# Patient Record
Sex: FEMALE | Race: WHITE | NOT HISPANIC OR LATINO | Employment: FULL TIME | URBAN - METROPOLITAN AREA
[De-identification: names, ages, dates, MRNs, and addresses within clinical notes are randomized per-mention and may not be internally consistent; named-entity substitution may affect disease eponyms.]

---

## 2023-06-23 ENCOUNTER — APPOINTMENT (OUTPATIENT)
Dept: PRIMARY CARE | Facility: CLINIC | Age: 58
End: 2023-06-23
Payer: COMMERCIAL

## 2024-05-08 ENCOUNTER — HOSPITAL ENCOUNTER (EMERGENCY)
Facility: HOSPITAL | Age: 59
Discharge: HOME | End: 2024-05-08
Attending: HOSPITALIST
Payer: COMMERCIAL

## 2024-05-08 VITALS
SYSTOLIC BLOOD PRESSURE: 134 MMHG | TEMPERATURE: 97.9 F | WEIGHT: 190.7 LBS | DIASTOLIC BLOOD PRESSURE: 93 MMHG | OXYGEN SATURATION: 97 % | HEART RATE: 83 BPM | BODY MASS INDEX: 30.65 KG/M2 | HEIGHT: 66 IN | RESPIRATION RATE: 16 BRPM

## 2024-05-08 DIAGNOSIS — J04.0 LARYNGITIS, ACUTE: Primary | ICD-10-CM

## 2024-05-08 DIAGNOSIS — J06.9 UPPER RESPIRATORY TRACT INFECTION, UNSPECIFIED TYPE: ICD-10-CM

## 2024-05-08 LAB
FLUAV RNA RESP QL NAA+PROBE: NOT DETECTED
FLUBV RNA RESP QL NAA+PROBE: NOT DETECTED
S PYO DNA THROAT QL NAA+PROBE: NOT DETECTED
SARS-COV-2 RNA RESP QL NAA+PROBE: NOT DETECTED

## 2024-05-08 PROCEDURE — 2500000001 HC RX 250 WO HCPCS SELF ADMINISTERED DRUGS (ALT 637 FOR MEDICARE OP): Performed by: HOSPITALIST

## 2024-05-08 PROCEDURE — 87651 STREP A DNA AMP PROBE: CPT | Performed by: HOSPITALIST

## 2024-05-08 PROCEDURE — 87636 SARSCOV2 & INF A&B AMP PRB: CPT | Performed by: HOSPITALIST

## 2024-05-08 PROCEDURE — 2500000006 HC RX 250 W HCPCS SELF ADMINISTERED DRUGS (ALT 637 FOR ALL PAYERS): Performed by: HOSPITALIST

## 2024-05-08 PROCEDURE — 99283 EMERGENCY DEPT VISIT LOW MDM: CPT

## 2024-05-08 RX ORDER — ETODOLAC 400 MG/1
1 TABLET, FILM COATED ORAL 2 TIMES DAILY
COMMUNITY
Start: 2016-11-21

## 2024-05-08 RX ORDER — VENLAFAXINE HYDROCHLORIDE 75 MG/1
75 CAPSULE, EXTENDED RELEASE ORAL
COMMUNITY
Start: 2022-11-04

## 2024-05-08 RX ORDER — LEVOTHYROXINE SODIUM 100 UG/1
100 TABLET ORAL
COMMUNITY
Start: 2024-02-01

## 2024-05-08 RX ORDER — AZITHROMYCIN 250 MG/1
250 TABLET, FILM COATED ORAL DAILY
Qty: 4 TABLET | Refills: 0 | Status: SHIPPED | OUTPATIENT
Start: 2024-05-09 | End: 2024-05-13

## 2024-05-08 RX ORDER — GUAIFENESIN 100 MG/5ML
200 SOLUTION ORAL ONCE
Status: COMPLETED | OUTPATIENT
Start: 2024-05-08 | End: 2024-05-08

## 2024-05-08 RX ORDER — AZITHROMYCIN 250 MG/1
500 TABLET, FILM COATED ORAL ONCE
Status: COMPLETED | OUTPATIENT
Start: 2024-05-08 | End: 2024-05-08

## 2024-05-08 RX ADMIN — GUAIFENESIN 200 MG: 200 SOLUTION ORAL at 19:28

## 2024-05-08 RX ADMIN — AZITHROMYCIN DIHYDRATE 500 MG: 250 TABLET ORAL at 20:32

## 2024-05-08 RX ADMIN — BENZOCAINE AND MENTHOL 1 LOZENGE: 15; 3.6 LOZENGE ORAL at 19:28

## 2024-05-08 ASSESSMENT — PAIN - FUNCTIONAL ASSESSMENT: PAIN_FUNCTIONAL_ASSESSMENT: 0-10

## 2024-05-08 ASSESSMENT — PAIN DESCRIPTION - LOCATION: LOCATION: THROAT

## 2024-05-08 ASSESSMENT — PAIN DESCRIPTION - PAIN TYPE: TYPE: ACUTE PAIN

## 2024-05-08 ASSESSMENT — PAIN SCALES - GENERAL: PAINLEVEL_OUTOF10: 6

## 2024-05-09 NOTE — DISCHARGE INSTRUCTIONS
Use over-the-counter cough medicine such as generic guaifenesin or Robitussin syrup.  Salt water gargling frequently throughout the day for relief  Eat soft foods, increase your fluid intake  Tylenol and/or Advil as needed for fever headache

## 2024-05-09 NOTE — ED PROVIDER NOTES
HPI   Chief Complaint   Patient presents with    Cough    Sore Throat     Patient states that she has been coughing for 3 days and has a sore throat. Patient states that today she started coughing up yellow phlegm and was worried about covid or strep throat.   She denies any sick contacts, denies any fevers or chills.      Patient History   Past Medical History:   Diagnosis Date    Hodgkin lymphoma, unspecified, spleen (Multi)     Hodgkin lymphoma, unspecified, spleen    Unspecified cataract     Cataracts, both eyes     Past Surgical History:   Procedure Laterality Date    OTHER SURGICAL HISTORY  03/16/2021    Tonsillectomy     No family history on file.  Social History     Tobacco Use    Smoking status: Never    Smokeless tobacco: Never   Substance Use Topics    Alcohol use: Not on file    Drug use: Not on file       Physical Exam   ED Triage Vitals [05/08/24 1920]   Temperature Heart Rate Respirations BP   36.6 °C (97.9 °F) 96 16 (!) 134/93      Pulse Ox Temp Source Heart Rate Source Patient Position   96 % Oral Monitor --      BP Location FiO2 (%)     -- --       Physical Exam  Gen.: Alert and oriented ×3, hoarse voice.  Head: Normocephalic atraumatic  Eyes:  Pupils equal reactive to light, extraocular muscle intact  Neck: No cervical lymphadenopathy thyromegaly, trachea midline   Heart: Regular rate and rhythm, no murmurs rubs or gallops  Lungs: Clear to auscultation bilaterally, no wheezes, rales, or rhonchi  Abdomen: Soft nontender positive bowel sounds, no rebound or guarding  Extremities: No peripheral edema cyanosis or clubbing  Skin: Warm and intact  Neuro: Cranial nerves II 2 through 12 grossly intact, no focal deficits  Psych: Insight and judgment intact    Labs Reviewed   GROUP A STREPTOCOCCUS, PCR - Normal       Result Value    Group A Strep PCR Not Detected     SARS-COV-2 PCR - Normal    Coronavirus 2019, PCR Not Detected      Narrative:     This assay has received FDA Emergency Use Authorization  (EUA) and is only authorized for the duration of time that circumstances exist to justify the authorization of the emergency use of in vitro diagnostic tests for the detection of SARS-CoV-2 virus and/or diagnosis of COVID-19 infection under section 564(b)(1) of the Act, 21 U.S.C. 360bbb-3(b)(1). This assay is an in vitro diagnostic nucleic acid amplification test for the qualitative detection of SARS-CoV-2 from nasopharyngeal specimens and has been validated for use at [facility]. Negative results do not preclude COVID-19 infections and should not be used as the sole basis for diagnosis, treatment, or other management decisions.     INFLUENZA A AND B PCR - Normal    Flu A Result Not Detected      Flu B Result Not Detected      Narrative:     This assay is an in vitro diagnostic multiplex nucleic acid amplification test for the detection and discrimination of Influenza A & B from nasopharyngeal specimens, and has been validated for use at [facility]. Negative results do not preclude Influenza A/B infections, and should not be used as the sole basis for diagnosis, treatment, or other management decisions. If Influenza A/B and RSV PCR results are negative, testing for Parainfluenza virus, Adenovirus and Metapneumovirus is routinely performed for [facility] pediatric oncology and intensive care inpatients, and is available on other patients by placing an add-on request.        ED Course & MDM   Diagnoses as of 05/08/24 2306   Laryngitis, acute   Upper respiratory tract infection, unspecified type       Medical Decision Making  Patient was tested for COVID-19, influenza A/B which were negative.  She was given Cepacol lozenge, guaifenesin, and Zithromax orally.  She will continue Zithromax at home.  She was advised to take over-the-counter cough syrup and do salt water gargling for relief.    Procedure  Procedures     Elpidio Parekh DO  05/08/24 2035       Elpidio Parekh,  DO  05/08/24 5977

## 2024-11-13 ENCOUNTER — TELEPHONE (OUTPATIENT)
Dept: GASTROENTEROLOGY | Facility: HOSPITAL | Age: 59
End: 2024-11-13
Payer: COMMERCIAL

## 2024-11-13 DIAGNOSIS — Z12.11 COLON CANCER SCREENING: Primary | ICD-10-CM

## 2024-11-13 NOTE — TELEPHONE ENCOUNTER
Patient called as she had a colonoscopy done with you in 2018. She is not a current patient. Per your recommendations, she should have a follow up colon 5 years from the last. Should she need another referral from her primary care or can you order?

## 2024-12-30 ENCOUNTER — APPOINTMENT (OUTPATIENT)
Dept: OBSTETRICS AND GYNECOLOGY | Facility: CLINIC | Age: 59
End: 2024-12-30
Payer: COMMERCIAL

## 2024-12-30 VITALS
WEIGHT: 182 LBS | DIASTOLIC BLOOD PRESSURE: 80 MMHG | HEIGHT: 66 IN | SYSTOLIC BLOOD PRESSURE: 122 MMHG | BODY MASS INDEX: 29.25 KG/M2

## 2024-12-30 DIAGNOSIS — Z12.4 CERVICAL CANCER SCREENING: ICD-10-CM

## 2024-12-30 DIAGNOSIS — Z01.419 WELL WOMAN EXAM: Primary | ICD-10-CM

## 2024-12-30 DIAGNOSIS — Z12.31 BREAST CANCER SCREENING BY MAMMOGRAM: ICD-10-CM

## 2024-12-30 PROCEDURE — 88175 CYTOPATH C/V AUTO FLUID REDO: CPT

## 2024-12-30 PROCEDURE — 87624 HPV HI-RISK TYP POOLED RSLT: CPT

## 2024-12-30 PROCEDURE — 1036F TOBACCO NON-USER: CPT | Performed by: OBSTETRICS & GYNECOLOGY

## 2024-12-30 PROCEDURE — 3008F BODY MASS INDEX DOCD: CPT | Performed by: OBSTETRICS & GYNECOLOGY

## 2024-12-30 PROCEDURE — 99386 PREV VISIT NEW AGE 40-64: CPT | Performed by: OBSTETRICS & GYNECOLOGY

## 2024-12-30 RX ORDER — MAGNESIUM 200 MG
TABLET ORAL
COMMUNITY

## 2024-12-30 RX ORDER — ASCORBIC ACID 250 MG
1 TABLET ORAL DAILY
COMMUNITY

## 2024-12-30 RX ORDER — AZITHROMYCIN 250 MG/1
TABLET, FILM COATED ORAL
COMMUNITY
Start: 2024-12-03

## 2024-12-30 ASSESSMENT — ENCOUNTER SYMPTOMS
SHORTNESS OF BREATH: 0
CHILLS: 0
APPETITE CHANGE: 0
CONSTIPATION: 0
ABDOMINAL PAIN: 0
DYSURIA: 0
COLOR CHANGE: 0
DIARRHEA: 0
FREQUENCY: 0
BLOOD IN STOOL: 0
FEVER: 0
ABDOMINAL DISTENTION: 0
FLANK PAIN: 0
VOMITING: 0
NAUSEA: 0
BACK PAIN: 0
FATIGUE: 0
UNEXPECTED WEIGHT CHANGE: 0
SLEEP DISTURBANCE: 0
HEMATURIA: 0

## 2024-12-30 ASSESSMENT — PAIN SCALES - GENERAL: PAINLEVEL_OUTOF10: 0-NO PAIN

## 2024-12-30 NOTE — PROGRESS NOTES
"History Of Present Illness  Routine Gyn Exam  Christelle Wells here for routine WWE.  Pt is postmenopausal.  Denies spotting or bleeding.     Concerns: none.     Medical and surgical histories reviewed with patient.   Exercise: none        Gynecologic History  Postmenopausal.  Sexually active: not currently ; sexually active with men and women in the past .  Last Pap: unsure last pap .   Last mammogram:  .       Obstetric History  OB History    Para Term  AB Living   0 0 0 0 0 0   SAB IAB Ectopic Multiple Live Births   0 0 0 0 0        Review of Systems   Constitutional:  Negative for appetite change, chills, fatigue, fever and unexpected weight change.   Respiratory:  Negative for shortness of breath.    Cardiovascular:  Negative for chest pain.   Gastrointestinal:  Negative for abdominal distention, abdominal pain, blood in stool, constipation, diarrhea, nausea and vomiting.   Endocrine: Negative for cold intolerance and heat intolerance.   Genitourinary:  Negative for dyspareunia, dysuria, flank pain, frequency, genital sores, hematuria, menstrual problem, pelvic pain, urgency, vaginal bleeding, vaginal discharge and vaginal pain.   Musculoskeletal:  Negative for back pain.   Skin:  Negative for color change.   Psychiatric/Behavioral:  Negative for sleep disturbance.        /80 (BP Location: Right arm, Patient Position: Sitting)   Ht 1.676 m (5' 6\")   Wt 82.6 kg (182 lb)   LMP  (LMP Unknown)   BMI 29.38 kg/m²      Physical Exam  Constitutional:       Appearance: Normal appearance.   HENT:      Head: Normocephalic and atraumatic.   Chest:   Breasts:     Right: Normal.      Left: Normal.   Abdominal:      General: Abdomen is flat.      Palpations: Abdomen is soft.      Tenderness: There is no abdominal tenderness.   Genitourinary:     General: Normal vulva.      Vagina: Erythema (atrophy) present.      Cervix: Normal.      Uterus: Normal.       Adnexa: Right adnexa normal " and left adnexa normal.      Comments: Pap collected today    Skin:     General: Skin is warm and dry.   Neurological:      Mental Status: She is alert and oriented to person, place, and time.   Psychiatric:         Mood and Affect: Mood normal.              Assessment/Plan         Routine Well Woman Exam Today  Discussed diet and exercise.   Reviewed routine health screenings.   Pap: pap done today   Recommend annual mammograms. Mammogram due , order placed.                  Amanda Ford MD

## 2025-01-09 LAB
CYTOLOGY CMNT CVX/VAG CYTO-IMP: NORMAL
HPV HR 12 DNA GENITAL QL NAA+PROBE: NEGATIVE
HPV HR GENOTYPES PNL CVX NAA+PROBE: NEGATIVE
HPV16 DNA SPEC QL NAA+PROBE: NEGATIVE
HPV18 DNA SPEC QL NAA+PROBE: NEGATIVE
LAB AP HPV GENOTYPE QUESTION: YES
LAB AP HPV HR: NORMAL
LABORATORY COMMENT REPORT: NORMAL
PATH REPORT.TOTAL CANCER: NORMAL

## 2025-01-16 ENCOUNTER — APPOINTMENT (OUTPATIENT)
Dept: CARDIOLOGY | Facility: HOSPITAL | Age: 60
End: 2025-01-16
Payer: COMMERCIAL

## 2025-01-16 ENCOUNTER — HOSPITAL ENCOUNTER (EMERGENCY)
Facility: HOSPITAL | Age: 60
Discharge: HOME | End: 2025-01-16
Attending: INTERNAL MEDICINE
Payer: COMMERCIAL

## 2025-01-16 ENCOUNTER — APPOINTMENT (OUTPATIENT)
Dept: RADIOLOGY | Facility: HOSPITAL | Age: 60
End: 2025-01-16
Payer: COMMERCIAL

## 2025-01-16 VITALS
SYSTOLIC BLOOD PRESSURE: 131 MMHG | BODY MASS INDEX: 28.93 KG/M2 | HEART RATE: 78 BPM | TEMPERATURE: 97.4 F | DIASTOLIC BLOOD PRESSURE: 81 MMHG | OXYGEN SATURATION: 94 % | WEIGHT: 180 LBS | RESPIRATION RATE: 17 BRPM | HEIGHT: 66 IN

## 2025-01-16 DIAGNOSIS — A08.4 VIRAL GASTROENTERITIS: Primary | ICD-10-CM

## 2025-01-16 DIAGNOSIS — R55 SYNCOPE, UNSPECIFIED SYNCOPE TYPE: ICD-10-CM

## 2025-01-16 LAB
ALBUMIN SERPL BCP-MCNC: 4 G/DL (ref 3.4–5)
ALP SERPL-CCNC: 72 U/L (ref 33–136)
ALT SERPL W P-5'-P-CCNC: 49 U/L (ref 7–45)
ANION GAP SERPL CALC-SCNC: 12 MMOL/L (ref 10–20)
APPEARANCE UR: CLEAR
AST SERPL W P-5'-P-CCNC: 39 U/L (ref 9–39)
ATRIAL RATE: 56 BPM
BACTERIA #/AREA URNS AUTO: ABNORMAL /HPF
BASOPHILS # BLD AUTO: 0.02 X10*3/UL (ref 0–0.1)
BASOPHILS NFR BLD AUTO: 0.4 %
BILIRUB SERPL-MCNC: 1.8 MG/DL (ref 0–1.2)
BILIRUB UR STRIP.AUTO-MCNC: NEGATIVE MG/DL
BNP SERPL-MCNC: 6 PG/ML (ref 0–99)
BUN SERPL-MCNC: 12 MG/DL (ref 6–23)
CALCIUM SERPL-MCNC: 8.8 MG/DL (ref 8.6–10.3)
CARDIAC TROPONIN I PNL SERPL HS: <3 NG/L (ref 0–13)
CARDIAC TROPONIN I PNL SERPL HS: <3 NG/L (ref 0–13)
CHLORIDE SERPL-SCNC: 106 MMOL/L (ref 98–107)
CO2 SERPL-SCNC: 24 MMOL/L (ref 21–32)
COLOR UR: ABNORMAL
CREAT SERPL-MCNC: 0.9 MG/DL (ref 0.5–1.05)
EGFRCR SERPLBLD CKD-EPI 2021: 73 ML/MIN/1.73M*2
EOSINOPHIL # BLD AUTO: 0.1 X10*3/UL (ref 0–0.7)
EOSINOPHIL NFR BLD AUTO: 2.1 %
ERYTHROCYTE [DISTWIDTH] IN BLOOD BY AUTOMATED COUNT: 12.8 % (ref 11.5–14.5)
FLUAV RNA RESP QL NAA+PROBE: NOT DETECTED
FLUBV RNA RESP QL NAA+PROBE: NOT DETECTED
GLUCOSE SERPL-MCNC: 106 MG/DL (ref 74–99)
GLUCOSE UR STRIP.AUTO-MCNC: NORMAL MG/DL
HCT VFR BLD AUTO: 42.2 % (ref 36–46)
HGB BLD-MCNC: 13.7 G/DL (ref 12–16)
IMM GRANULOCYTES # BLD AUTO: 0.02 X10*3/UL (ref 0–0.7)
IMM GRANULOCYTES NFR BLD AUTO: 0.4 % (ref 0–0.9)
KETONES UR STRIP.AUTO-MCNC: NEGATIVE MG/DL
LEUKOCYTE ESTERASE UR QL STRIP.AUTO: ABNORMAL
LIPASE SERPL-CCNC: 24 U/L (ref 9–82)
LYMPHOCYTES # BLD AUTO: 1.23 X10*3/UL (ref 1.2–4.8)
LYMPHOCYTES NFR BLD AUTO: 26.1 %
MAGNESIUM SERPL-MCNC: 2.1 MG/DL (ref 1.6–2.4)
MCH RBC QN AUTO: 27 PG (ref 26–34)
MCHC RBC AUTO-ENTMCNC: 32.5 G/DL (ref 32–36)
MCV RBC AUTO: 83 FL (ref 80–100)
MONOCYTES # BLD AUTO: 0.38 X10*3/UL (ref 0.1–1)
MONOCYTES NFR BLD AUTO: 8.1 %
MUCOUS THREADS #/AREA URNS AUTO: ABNORMAL /LPF
NEUTROPHILS # BLD AUTO: 2.97 X10*3/UL (ref 1.2–7.7)
NEUTROPHILS NFR BLD AUTO: 62.9 %
NITRITE UR QL STRIP.AUTO: NEGATIVE
NRBC BLD-RTO: 0 /100 WBCS (ref 0–0)
P AXIS: -16 DEGREES
P OFFSET: 187 MS
P ONSET: 134 MS
PH UR STRIP.AUTO: 5 [PH]
PLATELET # BLD AUTO: 107 X10*3/UL (ref 150–450)
POTASSIUM SERPL-SCNC: 3.9 MMOL/L (ref 3.5–5.3)
PR INTERVAL: 162 MS
PROT SERPL-MCNC: 6.7 G/DL (ref 6.4–8.2)
PROT UR STRIP.AUTO-MCNC: NEGATIVE MG/DL
Q ONSET: 215 MS
QRS COUNT: 9 BEATS
QRS DURATION: 98 MS
QT INTERVAL: 456 MS
QTC CALCULATION(BAZETT): 440 MS
QTC FREDERICIA: 446 MS
R AXIS: -11 DEGREES
RBC # BLD AUTO: 5.08 X10*6/UL (ref 4–5.2)
RBC # UR STRIP.AUTO: NEGATIVE /UL
RBC #/AREA URNS AUTO: ABNORMAL /HPF
SARS-COV-2 RNA RESP QL NAA+PROBE: NOT DETECTED
SODIUM SERPL-SCNC: 138 MMOL/L (ref 136–145)
SP GR UR STRIP.AUTO: 1.01
SQUAMOUS #/AREA URNS AUTO: ABNORMAL /HPF
T AXIS: -14 DEGREES
T OFFSET: 443 MS
TSH SERPL-ACNC: 1.14 MIU/L (ref 0.44–3.98)
UROBILINOGEN UR STRIP.AUTO-MCNC: NORMAL MG/DL
VENTRICULAR RATE: 56 BPM
WBC # BLD AUTO: 4.7 X10*3/UL (ref 4.4–11.3)
WBC #/AREA URNS AUTO: ABNORMAL /HPF

## 2025-01-16 PROCEDURE — 84484 ASSAY OF TROPONIN QUANT: CPT | Performed by: INTERNAL MEDICINE

## 2025-01-16 PROCEDURE — 87636 SARSCOV2 & INF A&B AMP PRB: CPT | Performed by: INTERNAL MEDICINE

## 2025-01-16 PROCEDURE — 84075 ASSAY ALKALINE PHOSPHATASE: CPT | Performed by: INTERNAL MEDICINE

## 2025-01-16 PROCEDURE — 71046 X-RAY EXAM CHEST 2 VIEWS: CPT | Mod: FOREIGN READ | Performed by: RADIOLOGY

## 2025-01-16 PROCEDURE — 36415 COLL VENOUS BLD VENIPUNCTURE: CPT | Performed by: INTERNAL MEDICINE

## 2025-01-16 PROCEDURE — 83690 ASSAY OF LIPASE: CPT | Performed by: INTERNAL MEDICINE

## 2025-01-16 PROCEDURE — 87086 URINE CULTURE/COLONY COUNT: CPT | Mod: AHULAB | Performed by: INTERNAL MEDICINE

## 2025-01-16 PROCEDURE — 71046 X-RAY EXAM CHEST 2 VIEWS: CPT

## 2025-01-16 PROCEDURE — 85025 COMPLETE CBC W/AUTO DIFF WBC: CPT | Performed by: INTERNAL MEDICINE

## 2025-01-16 PROCEDURE — 93005 ELECTROCARDIOGRAM TRACING: CPT

## 2025-01-16 PROCEDURE — 2500000004 HC RX 250 GENERAL PHARMACY W/ HCPCS (ALT 636 FOR OP/ED): Performed by: INTERNAL MEDICINE

## 2025-01-16 PROCEDURE — 96361 HYDRATE IV INFUSION ADD-ON: CPT

## 2025-01-16 PROCEDURE — 96374 THER/PROPH/DIAG INJ IV PUSH: CPT

## 2025-01-16 PROCEDURE — 83880 ASSAY OF NATRIURETIC PEPTIDE: CPT | Performed by: INTERNAL MEDICINE

## 2025-01-16 PROCEDURE — 99285 EMERGENCY DEPT VISIT HI MDM: CPT | Mod: 25 | Performed by: INTERNAL MEDICINE

## 2025-01-16 PROCEDURE — 84443 ASSAY THYROID STIM HORMONE: CPT | Performed by: INTERNAL MEDICINE

## 2025-01-16 PROCEDURE — 83735 ASSAY OF MAGNESIUM: CPT | Performed by: INTERNAL MEDICINE

## 2025-01-16 PROCEDURE — 81001 URINALYSIS AUTO W/SCOPE: CPT | Performed by: INTERNAL MEDICINE

## 2025-01-16 RX ORDER — ONDANSETRON 4 MG/1
4 TABLET, ORALLY DISINTEGRATING ORAL EVERY 8 HOURS PRN
Qty: 15 TABLET | Refills: 0 | Status: SHIPPED | OUTPATIENT
Start: 2025-01-16

## 2025-01-16 RX ORDER — ONDANSETRON HYDROCHLORIDE 2 MG/ML
4 INJECTION, SOLUTION INTRAVENOUS ONCE
Status: COMPLETED | OUTPATIENT
Start: 2025-01-16 | End: 2025-01-16

## 2025-01-16 RX ORDER — LOPERAMIDE HYDROCHLORIDE 2 MG/1
2 CAPSULE ORAL 4 TIMES DAILY PRN
Qty: 9 CAPSULE | Refills: 0 | Status: SHIPPED | OUTPATIENT
Start: 2025-01-16 | End: 2025-01-19

## 2025-01-16 RX ADMIN — ONDANSETRON 4 MG: 2 INJECTION, SOLUTION INTRAMUSCULAR; INTRAVENOUS at 12:13

## 2025-01-16 RX ADMIN — SODIUM CHLORIDE 1000 ML: 9 INJECTION, SOLUTION INTRAVENOUS at 11:39

## 2025-01-16 ASSESSMENT — PAIN SCALES - GENERAL: PAINLEVEL_OUTOF10: 0 - NO PAIN

## 2025-01-16 ASSESSMENT — PAIN - FUNCTIONAL ASSESSMENT: PAIN_FUNCTIONAL_ASSESSMENT: 0-10

## 2025-01-16 ASSESSMENT — COLUMBIA-SUICIDE SEVERITY RATING SCALE - C-SSRS
1. IN THE PAST MONTH, HAVE YOU WISHED YOU WERE DEAD OR WISHED YOU COULD GO TO SLEEP AND NOT WAKE UP?: NO
2. HAVE YOU ACTUALLY HAD ANY THOUGHTS OF KILLING YOURSELF?: NO
6. HAVE YOU EVER DONE ANYTHING, STARTED TO DO ANYTHING, OR PREPARED TO DO ANYTHING TO END YOUR LIFE?: NO

## 2025-01-16 NOTE — ED PROVIDER NOTES
HPI     CC: Syncope     HPI: Christelle Wells is a 59 y.o. female with a history of Hodgkin's disease, AML, hypothyroidism, anxiety, migraines, OA, RA, presents with syncope and concern for dehydration.  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  She landed on her knees and buttocks, did not hit her head.  She states she has been throwing up for the past few days, from Tuesday evening to Wednesday morning, yesterday.  P.o. intake has been reduced but she has been her to hydrate with Gatorade, etc.  She has associated diarrhea.  Although the triage note states that patient tested positive for influenza on Tuesday, patient denies this, states she never had a test.  She denies chest pain, ECG is nonischemic with no signs of arrhythmia.  Cough, congestion, or shortness of breath, fever or chills.  Urine output has been normal.  She does have a mild headache, denies vision changes, numbness/tingling, or weakness.  She has not tried to ambulate since the syncopal event.      ROS: 10-point review of systems was performed and is otherwise negative except as noted in HPI.    Limitations to history: N/A    Independent Historians: Coworker    External Records Reviewed: Outpatient notes in EMR    Past Medical History: Noncontributory except per HPI     Past Surgical History: Noncontributory except per HPI     Family History: Reviewed and noncontributory     Social History:  Denies tobacco. Denies ETOH. Denies illicit drugs.    Social Determinants Affecting Care: N/A    No Known Allergies    Home Meds:   Current Outpatient Medications   Medication Instructions    ascorbic acid (Vitamin C) 250 mg tablet 1 tablet, oral, Daily    azithromycin (Zithromax) 250 mg tablet TAKE 2 TABLETS (500 MG) BY MOUTH ON DAY 1. THEN ONE TABLET (250 MG) ONCE DAILY BY MOUTH ON DAYS 2-5.    cyanocobalamin, vitamin B-12, 1,000 mcg tablet, sublingual sublingual     "levothyroxine (SYNTHROID, LEVOXYL) 100 mcg, Daily RT    loperamide (IMODIUM) 2 mg, oral, 4 times daily PRN    ondansetron ODT (ZOFRAN-ODT) 4 mg, oral, Every 8 hours PRN    venlafaxine XR (EFFEXOR-XR) 75 mg, Daily RT        Physical Exam     ED Triage Vitals [01/16/25 1011]   Temperature Heart Rate Respirations BP   36.3 °C (97.4 °F) 56 17 124/80      Pulse Ox Temp src Heart Rate Source Patient Position   96 % -- -- --      BP Location FiO2 (%)     -- --         Heart Rate:  [56-78]   Temperature:  [36.3 °C (97.4 °F)]   Respirations:  [17]   BP: (115-131)/(71-84)   Height:  [167.6 cm (5' 6\")]   Weight:  [81.6 kg (180 lb)]   Pulse Ox:  [94 %-98 %]      Physical Exam  Vitals and nursing note reviewed.     CONSTITUTIONAL: Well appearing, well nourished, in no acute distress.   HENMT: Head atraumatic. Airway patent. Nasal mucosa clear. Mouth with normal mucosa, clear oropharynx. Uvula midline. Neck supple.    EYES: Clear bilaterally, pupils equally round and reactive to light.   CARDIOVASCULAR: Normal rate, regular rhythm.  Heart sounds S1, S2.  No murmurs, rubs or gallops. Normal pulses. Capillary refill < 2 sec.   RESPIRATORY: No increased work of breathing. Breath sounds clear and equal bilaterally.  GASTROINTESTINAL: Abdomen soft, non-distended, non-tender. No rebound, no guarding. Normal bowel sounds. No palpable masses.  GENITOURINARY:  No CVA tenderness.  MUSCULOSKELETAL: Spine appears normal, range of motion is not limited, no muscle or joint tenderness. No edema.   NEUROLOGICAL: AAO x3. CN II-XII intact. 5/5 strength and SILT UEs/LEs. FTN/HTS intact. NIHSS 0.  SKIN: Warm, dry and intact. No rash or notable lesions.  PSYCHIATRIC: Normal mood and affect.  HEME/LYMPH: No adenopathy or splenomegaly.    Diagnostic Results      ECG: ECGs read and interpreted by me. See ED Course, below, for interpretation.    Labs Reviewed   CBC WITH AUTO DIFFERENTIAL - Abnormal       Result Value    WBC 4.7      nRBC 0.0      RBC " 5.08      Hemoglobin 13.7      Hematocrit 42.2      MCV 83      MCH 27.0      MCHC 32.5      RDW 12.8      Platelets 107 (*)     Neutrophils % 62.9      Immature Granulocytes %, Automated 0.4      Lymphocytes % 26.1      Monocytes % 8.1      Eosinophils % 2.1      Basophils % 0.4      Neutrophils Absolute 2.97      Immature Granulocytes Absolute, Automated 0.02      Lymphocytes Absolute 1.23      Monocytes Absolute 0.38      Eosinophils Absolute 0.10      Basophils Absolute 0.02     COMPREHENSIVE METABOLIC PANEL - Abnormal    Glucose 106 (*)     Sodium 138      Potassium 3.9      Chloride 106      Bicarbonate 24      Anion Gap 12      Urea Nitrogen 12      Creatinine 0.90      eGFR 73      Calcium 8.8      Albumin 4.0      Alkaline Phosphatase 72      Total Protein 6.7      AST 39      Bilirubin, Total 1.8 (*)     ALT 49 (*)    URINALYSIS WITH REFLEX CULTURE AND MICROSCOPIC - Abnormal    Color, Urine Light-Yellow      Appearance, Urine Clear      Specific Gravity, Urine 1.009      pH, Urine 5.0      Protein, Urine NEGATIVE      Glucose, Urine Normal      Blood, Urine NEGATIVE      Ketones, Urine NEGATIVE      Bilirubin, Urine NEGATIVE      Urobilinogen, Urine Normal      Nitrite, Urine NEGATIVE      Leukocyte Esterase, Urine 250 Immanuel/uL (*)    MICROSCOPIC ONLY, URINE - Abnormal    WBC, Urine 1-5      RBC, Urine 1-2      Squamous Epithelial Cells, Urine 1-9 (SPARSE)      Bacteria, Urine 1+ (*)     Mucus, Urine FEW     MAGNESIUM - Normal    Magnesium 2.10     B-TYPE NATRIURETIC PEPTIDE - Normal    BNP 6      Narrative:        <100 pg/mL - Heart failure unlikely  100-299 pg/mL - Intermediate probability of acute heart                  failure exacerbation. Correlate with clinical                  context and patient history.    >=300 pg/mL - Heart Failure likely. Correlate with clinical                  context and patient history.    BNP testing is performed using different testing methodology at [facility] than at other [facility]. Direct result comparisons should only be made within the same method.      SERIAL TROPONIN-INITIAL - Normal    Troponin I, High Sensitivity <3      Narrative:     Less than 99th percentile of normal range cutoff-  Female and children under 18 years old <14 ng/L; Male <21 ng/L: Negative  Repeat testing should be performed if clinically indicated.     Female and children under 18 years old 14-50 ng/L; Male 21-50 ng/L:  Consistent with possible cardiac damage and possible increased clinical   risk. Serial measurements may help to assess extent of myocardial damage.     >50 ng/L: Consistent with cardiac damage, increased clinical risk and  myocardial infarction. Serial measurements may help assess extent of   myocardial damage.      NOTE: Children less than 1 year old may have higher baseline troponin   levels and results should be interpreted in conjunction with the overall   clinical context.     NOTE: Troponin I testing is performed using a different   testing methodology at [facility] than at [facility]. Direct result comparisons should only   be made within the same method.   SERIAL TROPONIN, 1 HOUR - Normal    Troponin I, High Sensitivity <3      Narrative:     Less than 99th percentile of normal range cutoff-  Female and children under 18 years old <14 ng/L; Male <21 ng/L: Negative  Repeat testing should be performed if clinically indicated.     Female and children under 18 years old 14-50 ng/L; Male 21-50 ng/L:  Consistent with possible cardiac damage and possible increased clinical   risk. Serial measurements may help to assess extent of myocardial damage.     >50 ng/L: Consistent with cardiac damage, increased clinical risk and  myocardial infarction. Serial measurements may help assess extent of   myocardial damage.      NOTE: Children less than 1 year old may have higher baseline troponin   levels and results should be interpreted in  conjunction with the overall   clinical context.     NOTE: Troponin I testing is performed using a different   testing methodology at [facility] than at other   [facility]. Direct result comparisons should only   be made within the same method.   SARS-COV-2 AND INFLUENZA A/B PCR - Normal    Flu A Result Not Detected      Flu B Result Not Detected      Coronavirus 2019, PCR Not Detected      Narrative:     This assay has received FDA Emergency Use Authorization (EUA) and  is only authorized for the duration of time that circumstances exist to justify the authorization of the emergency use of in vitro diagnostic tests for the detection of SARS-CoV-2 virus and/or diagnosis of COVID-19 infection under section 564(b)(1) of the Act, 21 U.S.C. 360bbb-3(b)(1). Testing for SARS-CoV-2 is only recommended for patients who meet current clinical and/or epidemiological criteria as defined by federal, state, or local public health directives. This assay is an in vitro diagnostic nucleic acid amplification test for the qualitative detection of SARS-CoV-2, Influenza A, and Influenza B from nasopharyngeal specimens and has been validated for use at [facility]. Negative results do not preclude COVID-19 infections or Influenza A/B infections, and should not be used as the sole basis for diagnosis, treatment, or other management decisions. If Influenza A/B and RSV PCR results are negative, testing for Parainfluenza virus, Adenovirus and Metapneumovirus is routinely performed for [facility] pediatric oncology and intensive care inpatients, and is available on other patients by placing an add-on request.    LIPASE - Normal    Lipase 24      Narrative:     Venipuncture immediately after or during the administration of Metamizole may lead to falsely low results. Testing should be performed immediately prior to Metamizole dosing.   TSH WITH REFLEX TO FREE T4 IF ABNORMAL - Normal    Thyroid Stimulating  Hormone 1.14      Narrative:     TSH testing is performed using different testing methodology at [facility] than at other [facility]. Direct result comparisons should only be made within the same method.     URINE CULTURE   TROPONIN SERIES- (INITIAL, 1 HR)    Narrative:     The following orders were created for panel order Troponin I Series, High Sensitivity (0, 1 HR).  Procedure                               Abnormality         Status                     ---------                               -----------         ------                     Troponin I, High Sensiti...[[id number]]  Normal              Final result               Troponin, High Sensitivi...[[id number]]  Normal              Final result                 Please view results for these tests on the individual orders.   URINALYSIS WITH REFLEX CULTURE AND MICROSCOPIC    Narrative:     The following orders were created for panel order Urinalysis with Reflex Culture and Microscopic.  Procedure                               Abnormality         Status                     ---------                               -----------         ------                     Urinalysis with Reflex C...[[id number]]  Abnormal            Final result               Extra Urine Gray Tube[[id number]]                                                         Please view results for these tests on the individual orders.   EXTRA URINE GRAY TUBE         XR chest 2 views   Final Result   No radiographic evidence of acute cardiopulmonary disease.   Signed by Major Dukes MD                    No data recorded                Procedure  Procedures    ED Course & MDM   Assessment/Plan:   Christelle Wells is a 59 y.o. female with a history of Hodgkin's disease, AML, hypothyroidism, anxiety, migraines, OA, RA, presents with syncope and concern for dehydration.  She has had nausea/vomiting and diarrhea for the past few days.  Suspect probable viral illness with vasovagal syncopal episode.   Workup was initiated with ECG, labs, chest x-ray.  She was given a fluid bolus.  Will check orthostatics. See below for details of ED course and ultimate disposition.    Medications   sodium chloride 0.9 % bolus 1,000 mL (0 mL intravenous Stopped 1/16/25 1319)   ondansetron (Zofran) injection 4 mg (4 mg intravenous Given 1/16/25 1213)        ED Course as of 01/16/25 1523   Thu Jan 16, 2025   1045 ECG read and interpreted by me.  Sinus bradycardia, rate 56.  LVH.  Normal axis.  Normal intervals.  No significant ST or T wave derangements. [CG]   1227 CXR no radiographic evidence of acute cardiopulmonary disease. [CG]   1227 Labs are notable for normal CBC without leukocytosis, mild thrombocytopenia 107, normal CMP except for elevated ALT 49 and total serum bilirubin 1.8, normal troponin, magnesium, BNP.  Negative viral swabs. [CG]   1316 Repeat troponin negative [CG]   1320 Orthostatics negative. Blood pressure actually went up from lying to standing. Heart rate increased by 18 points [CG]   1410 Lipase and TSH WNL [CG]   1423 Patient was reevaluated.  She is feeling much better after fluids.  She is amenable to being discharged home with Zofran and Imodium.  Patient was discharged home with anticipatory guidance and strict return precautions. [CG]   1445 UA with LE and 1-5 WBCs, 1+ bacteria [CG]      ED Course User Index  [CG] Pat Wallace MD         Diagnoses as of 01/16/25 1523   Viral gastroenteritis   Syncope, unspecified syncope type       Disposition:   DISCHARGE.  The patient was discharged with both verbal and written anticipatory guidance and strict return precautions. Discharge diagnosis, instructions and plan were discussed and understood. I emphasized the importance of following up with their primary care provider within 24-48 hours and with any referrals in the timeframe recommended. At the time of discharge the patient was comfortable and was in no apparent distress. Patient is aware of  diagnostic uncertainty and was notified though testing is negative here, there is a very small chance that pathology may be missed.  The patient understands these risks and the patient/family understood to call 911 or return immediately to the emergency department if the symptoms worsen or if they have any additional concerns.      FOLLOW UP  Primary care provider in 1-2 days.      ED Prescriptions       Medication Sig Dispense Start Date End Date Auth. Provider    ondansetron ODT (Zofran-ODT) 4 mg disintegrating tablet Dissolve 1 tablet (4 mg) in the mouth every 8 hours if needed for nausea or vomiting. 15 tablet 1/16/2025 -- Pat Wallace MD    loperamide (Imodium) 2 mg capsule Take 1 capsule (2 mg) by mouth 4 times a day as needed for diarrhea for up to 3 days. 9 capsule 1/16/2025 1/19/2025 MD Pat Calix MD  EM/IM/Peds    This note was dictated by speech recognition. Minor errors in transcription may be present.     Pat Wallace MD  01/16/25 7939

## 2025-01-16 NOTE — DISCHARGE INSTRUCTIONS
You were seen today for viral symptoms and concern for dehydration.  Your evaluation was not concerning for an emergency at this time.  We gave you prescription for an antinausea and antidiarrheal.  Please see the attached information sheet for information about your condition, how to care for your condition at home, and reasons to return to the emergency department. Take any prescriptions written today as prescribed. You should call your primary care provider within 24 hours to tell them about today's visit, including any new medications or medication changes, as he or she may want to see you in the office for further evaluation. If you do not have a primary care provider, call  [phone] for an appointment. We offer in-person office visits as well as virtual options. Please do not hesitate to call  [phone] or return to the emergency department with any new or unresolved concerns or symptoms. Thank you for choosing [facility] for your care.

## 2025-01-16 NOTE — ED TRIAGE NOTES
Pt. To ED for syncope episode while at work. Patient states she felt weak and dizzy prior to lowering her self to the ground. No LOC No blood thinners. Patient denies trauma or injury. Patient tested positive for the flu on Tuesday today her first day back at work.

## 2025-01-17 LAB — BACTERIA UR CULT: NORMAL

## 2025-01-21 ENCOUNTER — HOSPITAL ENCOUNTER (OUTPATIENT)
Dept: RADIOLOGY | Facility: CLINIC | Age: 60
Discharge: HOME | End: 2025-01-21
Payer: COMMERCIAL

## 2025-01-21 DIAGNOSIS — Z12.31 BREAST CANCER SCREENING BY MAMMOGRAM: ICD-10-CM

## 2025-01-21 PROCEDURE — 77063 BREAST TOMOSYNTHESIS BI: CPT | Performed by: RADIOLOGY

## 2025-01-21 PROCEDURE — 77067 SCR MAMMO BI INCL CAD: CPT

## 2025-01-21 PROCEDURE — 77067 SCR MAMMO BI INCL CAD: CPT | Performed by: RADIOLOGY

## 2025-02-09 LAB
ATRIAL RATE: 56 BPM
P AXIS: -16 DEGREES
P OFFSET: 187 MS
P ONSET: 134 MS
PR INTERVAL: 162 MS
Q ONSET: 215 MS
QRS COUNT: 9 BEATS
QRS DURATION: 98 MS
QT INTERVAL: 456 MS
QTC CALCULATION(BAZETT): 440 MS
QTC FREDERICIA: 446 MS
R AXIS: -11 DEGREES
T AXIS: -14 DEGREES
T OFFSET: 443 MS
VENTRICULAR RATE: 56 BPM

## 2025-04-03 RX ORDER — VENLAFAXINE HYDROCHLORIDE 75 MG/1
1 CAPSULE, EXTENDED RELEASE ORAL DAILY
COMMUNITY
Start: 2021-12-07

## 2025-04-03 RX ORDER — LEVOTHYROXINE SODIUM 112 UG/1
112 TABLET ORAL
COMMUNITY

## 2025-04-03 RX ORDER — SUMATRIPTAN SUCCINATE 50 MG/1
1 TABLET ORAL AS NEEDED
COMMUNITY
Start: 2024-12-31

## 2025-04-03 RX ORDER — CHOLECALCIFEROL (VITAMIN D3) 25 MCG
1000 TABLET ORAL
COMMUNITY

## 2025-04-03 RX ORDER — BENZONATATE 200 MG/1
CAPSULE ORAL
COMMUNITY
Start: 2025-03-17

## 2025-04-10 ENCOUNTER — APPOINTMENT (OUTPATIENT)
Dept: GASTROENTEROLOGY | Facility: HOSPITAL | Age: 60
End: 2025-04-10
Payer: COMMERCIAL

## 2025-04-16 ENCOUNTER — APPOINTMENT (OUTPATIENT)
Dept: ORTHOPEDIC SURGERY | Facility: CLINIC | Age: 60
End: 2025-04-16
Payer: COMMERCIAL

## 2025-04-16 DIAGNOSIS — M25.562 LEFT KNEE PAIN, UNSPECIFIED CHRONICITY: ICD-10-CM

## 2025-05-02 ENCOUNTER — APPOINTMENT (OUTPATIENT)
Dept: ORTHOPEDIC SURGERY | Facility: CLINIC | Age: 60
End: 2025-05-02
Payer: COMMERCIAL

## 2025-05-02 ENCOUNTER — SPECIALTY PHARMACY (OUTPATIENT)
Dept: PHARMACY | Facility: CLINIC | Age: 60
End: 2025-05-02

## 2025-05-02 DIAGNOSIS — M79.672 LEFT FOOT PAIN: ICD-10-CM

## 2025-05-02 DIAGNOSIS — M17.12 ARTHRITIS OF LEFT KNEE: ICD-10-CM

## 2025-05-02 PROCEDURE — 1036F TOBACCO NON-USER: CPT | Performed by: ORTHOPAEDIC SURGERY

## 2025-05-02 PROCEDURE — 99204 OFFICE O/P NEW MOD 45 MIN: CPT | Performed by: ORTHOPAEDIC SURGERY

## 2025-05-02 RX ORDER — HYALURONATE SODIUM, STABILIZED 60 MG/3 ML
60 SYRINGE (ML) INTRAARTICULAR ONCE
Qty: 3 ML | Refills: 0 | Status: SHIPPED | OUTPATIENT
Start: 2025-05-02 | End: 2025-05-03

## 2025-05-02 ASSESSMENT — PAIN SCALES - GENERAL: PAINLEVEL_OUTOF10: 8

## 2025-05-02 ASSESSMENT — PAIN - FUNCTIONAL ASSESSMENT: PAIN_FUNCTIONAL_ASSESSMENT: 0-10

## 2025-05-02 NOTE — LETTER
May 2, 2025     Marco A Haile PA-C  [address] [facility] [address]  [place] [address]    Patient: Christelle Wells   YOB: 1965   Date of Visit: 5/2/2025       Dear Dr. Marco A Haile PA-C:    Thank you for referring Christelle Wells to me for evaluation. Below are my notes for this consultation.  If you have questions, please do not hesitate to call me. I look forward to following your patient along with you.       Sincerely,     Agustin Lugo MD      CC: No Recipients  ______________________________________________________________________________________    This is a consultation from Dr. Marco A Haile PA-C for   Chief Complaint   Patient presents with   • Left Knee - Pain       This is a 60 y.o. female who presents for evaluation of left knee pain.  Patient states she has had issues with the left knee on and off for years, is longstanding chronic problems been recently exacerbated.  Complains of sharp pain over the lateral aspect of the knee worse with walking but with rest.  No numbness or tingling no fevers or chills no shooting pain down the leg.  It bothers her quite a bit and interferes with her activities.  She has never had surgery on the knee.  She has had injections in the past which have been helpful but only for a few weeks.  Most recent cortisone injection in March.    Physical Exam    There has been no interval change in this patient's past medical, surgical, medications, allergies, family history or social history since the most recent visit to a provider within our department. 14 point review of systems was performed, reviewed, and negative except for pertinent positives documented in the history of present illness.     Constitutional: well developed, well nourished female in no acute distress  Psychiatric: normal mood, appropriate affect  Eyes: sclera anicteric  HENT: normocephalic/atraumatic  CV: regular rate and rhythm   Respiratory: non labored  breathing  Integumentary: no rash  Neurological: moves all extremities    Left knee exam: skin intact no lacerations or abrations. no effusion.  Tender lateral joint line. negative log roll negative patellar grind. ROM 0-120. stable to varus and valgus stress at 0 and 30 degrees. negative lachman negative posterior drawer negative kelly. 5/5 ehl/fhl/gs/ta. silt s/s/sp/dp/t. 2+ dp/pt        Imaging:  Xrays on outside disc were reviewed and independently interpreted by me today, they show moderate degenerative changes of the left knee with lateral joint space narrowing    Procedures      Impression/Plan: This is a 60 y.o. female with left knee arthritis.  I had an in depth discussion with the patient regarding treatment options for arthritis and their relative risks and benefits. We reviewed surgical and nonsurgical option for treatment. Treatments include anti inflammatory medications, physical therapy, weight loss, activity modification, use of assistive devices, injection therapies. We discussed current prescriptions and risks and benefits of continuation of prescription medication as apporpriate. We discussed that arthritis is often progressive over time, an in end stage arthritis surgical interventions can be considered, including arthroplasty. All questions were answered and the patient voiced their understanding.  At this point her knee is not radiographically ready for total knee however she has significant degenerative changes.  Will get her set up with a gel injection, I discussed the risk benefits and appropriate use of that prescription medication today.  Will see her back once available    BMI Readings from Last 1 Encounters:   01/16/25 29.05 kg/m²      Lab Results   Component Value Date    CREATININE 0.90 01/16/2025     Tobacco Use: Low Risk  (5/2/2025)    Patient History    • Smoking Tobacco Use: Never    • Smokeless Tobacco Use: Never    • Passive Exposure: Not on file      Computed MELD 3.0  "unavailable. One or more values for this score either were not found within the given timeframe or did not fit some other criterion.  Computed MELD-Na unavailable. One or more values for this score either were not found within the given timeframe or did not fit some other criterion.       Lab Results   Component Value Date    HGBA1C 5.8 (H) 12/31/2024     No results found for: \"STAPHMRSASCR\""

## 2025-05-02 NOTE — PROGRESS NOTES
This is a consultation from Dr. Marco A Haile PA-C for   Chief Complaint   Patient presents with    Left Knee - Pain       This is a 60 y.o. female who presents for evaluation of left knee pain.  Patient states she has had issues with the left knee on and off for years, is longstanding chronic problems been recently exacerbated.  Complains of sharp pain over the lateral aspect of the knee worse with walking but with rest.  No numbness or tingling no fevers or chills no shooting pain down the leg.  It bothers her quite a bit and interferes with her activities.  She has never had surgery on the knee.  She has had injections in the past which have been helpful but only for a few weeks.  Most recent cortisone injection in March.    Physical Exam    There has been no interval change in this patient's past medical, surgical, medications, allergies, family history or social history since the most recent visit to a provider within our department. 14 point review of systems was performed, reviewed, and negative except for pertinent positives documented in the history of present illness.     Constitutional: well developed, well nourished female in no acute distress  Psychiatric: normal mood, appropriate affect  Eyes: sclera anicteric  HENT: normocephalic/atraumatic  CV: regular rate and rhythm   Respiratory: non labored breathing  Integumentary: no rash  Neurological: moves all extremities    Left knee exam: skin intact no lacerations or abrations. no effusion.  Tender lateral joint line. negative log roll negative patellar grind. ROM 0-120. stable to varus and valgus stress at 0 and 30 degrees. negative lachman negative posterior drawer negative kelly. 5/5 ehl/fhl/gs/ta. silt s/s/sp/dp/t. 2+ dp/pt        Imaging:  Xrays on outside disc were reviewed and independently interpreted by me today, they show moderate degenerative changes of the left knee with lateral joint space  "narrowing    Procedures      Impression/Plan: This is a 60 y.o. female with left knee arthritis.  I had an in depth discussion with the patient regarding treatment options for arthritis and their relative risks and benefits. We reviewed surgical and nonsurgical option for treatment. Treatments include anti inflammatory medications, physical therapy, weight loss, activity modification, use of assistive devices, injection therapies. We discussed current prescriptions and risks and benefits of continuation of prescription medication as apporpriate. We discussed that arthritis is often progressive over time, an in end stage arthritis surgical interventions can be considered, including arthroplasty. All questions were answered and the patient voiced their understanding.  At this point her knee is not radiographically ready for total knee however she has significant degenerative changes.  Will get her set up with a gel injection, I discussed the risk benefits and appropriate use of that prescription medication today.  Will see her back once available    BMI Readings from Last 1 Encounters:   01/16/25 29.05 kg/m²      Lab Results   Component Value Date    CREATININE 0.90 01/16/2025     Tobacco Use: Low Risk  (5/2/2025)    Patient History     Smoking Tobacco Use: Never     Smokeless Tobacco Use: Never     Passive Exposure: Not on file      Computed MELD 3.0 unavailable. One or more values for this score either were not found within the given timeframe or did not fit some other criterion.  Computed MELD-Na unavailable. One or more values for this score either were not found within the given timeframe or did not fit some other criterion.       Lab Results   Component Value Date    HGBA1C 5.8 (H) 12/31/2024     No results found for: \"STAPHMRSASCR\"  "

## 2025-05-09 ENCOUNTER — HOSPITAL ENCOUNTER (OUTPATIENT)
Dept: RADIOLOGY | Facility: EXTERNAL LOCATION | Age: 60
Discharge: HOME | End: 2025-05-09

## 2025-06-02 ENCOUNTER — TELEPHONE (OUTPATIENT)
Dept: ORTHOPEDIC SURGERY | Facility: CLINIC | Age: 60
End: 2025-06-02
Payer: COMMERCIAL

## 2025-06-02 NOTE — TELEPHONE ENCOUNTER
Sent PA form to [facility] to get approval for Durolane through medical benefits. Denied through rx benefits.

## 2025-07-01 ENCOUNTER — APPOINTMENT (OUTPATIENT)
Dept: ORTHOPEDIC SURGERY | Facility: CLINIC | Age: 60
End: 2025-07-01
Payer: COMMERCIAL

## 2025-07-02 ENCOUNTER — TELEPHONE (OUTPATIENT)
Dept: ORTHOPEDIC SURGERY | Facility: CLINIC | Age: 60
End: 2025-07-02
Payer: COMMERCIAL

## 2025-07-15 ENCOUNTER — APPOINTMENT (OUTPATIENT)
Dept: ORTHOPEDIC SURGERY | Facility: CLINIC | Age: 60
End: 2025-07-15
Payer: COMMERCIAL

## 2025-07-15 DIAGNOSIS — M17.12 ARTHRITIS OF LEFT KNEE: Primary | ICD-10-CM

## 2025-07-15 PROCEDURE — 20610 DRAIN/INJ JOINT/BURSA W/O US: CPT | Performed by: ORTHOPAEDIC SURGERY

## 2025-07-15 PROCEDURE — 1036F TOBACCO NON-USER: CPT | Performed by: ORTHOPAEDIC SURGERY

## 2025-07-15 PROCEDURE — 99214 OFFICE O/P EST MOD 30 MIN: CPT | Performed by: ORTHOPAEDIC SURGERY

## 2025-07-15 ASSESSMENT — PAIN - FUNCTIONAL ASSESSMENT: PAIN_FUNCTIONAL_ASSESSMENT: 0-10

## 2025-07-15 ASSESSMENT — PAIN SCALES - GENERAL: PAINLEVEL_OUTOF10: 8

## 2025-07-15 NOTE — PROGRESS NOTES
Christelle Wells is a 60 y.o. female here for gel injection  Chief Complaint   Patient presents with    Left Knee - Pain, Arthritis     LT KNEE DUROLANE CECILYMELANIE ZAVALA Inj/Asp: L knee on 7/15/2025 9:46 AM  Indications: pain and joint swelling  Details: 22 G needle, anterolateral approach  Medications: 60 mg sodium hyaluronate 60 mg/3 mL    Discussion:  I discussed the conservative treatment options for knee osteoarthritis including but not limited to physical therapy, oral NSAIDS, activity and lifestyle modification, hyaluronic acid injections and corticosteroid injections. Pt has elected to undergo a hyaluronic acid injection today. I have explained the risk and benefits of an injection including the possibility of joint infection, bleeding, damage to cartilage, allergic reaction. Patient verbalized understanding and gave verbal consent wishes to proceed with a intra-articular hyaluronic acid injection for their knee.    Procedure:  After discussing the risk and benefits of the procedure, we proceeded with an intra-articular left knee injection. We discussed the risks and benefits and potential morbidity related to the treatment, and to the prescription medication administered in the injection    With the patient's informed verbal consent, the left knee were prepped in standard sterile fashion with Chlorhexidine. The skin was then anesthetized with ethyl chloride spray and cleaned again with Chlorhexidine. The left knee was then apirated/injected with a prefilled 20-gauge syringe of 3ml/60mg Durolane using the lateral approach without complications.  The patient tolerated this well.  A bandaid was applied and the patient ambulated out of the clinic on ther own accord without difficulty. Patient was instructed to avoid physical activity for 24-48 hours to prevent the knees from swelling and may ice the knee as tolerated. Patient should contact the office if any signs of of infection appear: redness, fever, chills,  drainage, swelling or warmth to the knee.        Procedure, treatment alternatives, risks and benefits explained, specific risks discussed. Consent was given by the patient. Immediately prior to procedure a time out was called to verify the correct patient, procedure, equipment, support staff and site/side marked as required. Patient was prepped and draped in the usual sterile fashion.

## 2025-07-21 ASSESSMENT — EXTERNAL EXAM - LEFT EYE: OS_EXAM: NORMAL

## 2025-07-21 ASSESSMENT — EXTERNAL EXAM - RIGHT EYE: OD_EXAM: NORMAL

## 2025-07-21 ASSESSMENT — CUP TO DISC RATIO: OS_RATIO: .25

## 2025-07-21 NOTE — PROGRESS NOTES
LOV 6/22/21      Combined form of age-related cataract, right eyeH25.811   -Not visually significant at this time. Monitor.   -F/u 1 year for comprehensive exam.     Posterior capsular opacification, left eye  Pseudophakia of left eyeZ96.1 - 4/22/21 - Complex with Trypan blue   -Doing well. Good vision. IOP good. Off all gtts.     Vitreous floaters of both eyesH43.393   -No retinal tear or detachment seen on exam. Monitor with annual dilated exam.    Eyelid lesions, bilateral  -Papillomatous skin lesions bilateral upper eyelids, per patient, occasional irritation. Interested in removal - will refer to oculoplastic surgeon.     Brow ptosis, kbghxB29.811   OjmudjoflplerqgA27.839   -Longstanding, per patient. May consider blepharoplasty in the future if condition worsens.     CxhzgsthpT19.00   LpqxtcmskbsV09.209   LfmwyieczpQ14.4   -New Rx for glasses given per patient request. Patient's signature obtained to acknowledge and confirm that a paper copy of glasses Rx was given to patient in compliance with [facility] Eyeglass Rule. Electronic copy of Rx will also be available via [employer].       No history of refractive surgery.   No FH of AMD/glaucoma

## 2025-07-23 ENCOUNTER — APPOINTMENT (OUTPATIENT)
Dept: OPHTHALMOLOGY | Age: 60
End: 2025-07-23
Payer: COMMERCIAL

## 2025-07-23 DIAGNOSIS — H02.9 EYELID LESION: ICD-10-CM

## 2025-07-23 DIAGNOSIS — H57.819 BROW PTOSIS: ICD-10-CM

## 2025-07-23 DIAGNOSIS — H43.393 VITREOUS FLOATERS OF BOTH EYES: ICD-10-CM

## 2025-07-23 DIAGNOSIS — H25.811 COMBINED FORM OF AGE-RELATED CATARACT, RIGHT EYE: Primary | ICD-10-CM

## 2025-07-23 DIAGNOSIS — H52.03 HYPEROPIA, BILATERAL: ICD-10-CM

## 2025-07-23 DIAGNOSIS — H02.834 DERMATOCHALASIS OF BOTH UPPER EYELIDS: ICD-10-CM

## 2025-07-23 DIAGNOSIS — Z96.1 PSEUDOPHAKIA: ICD-10-CM

## 2025-07-23 DIAGNOSIS — H52.203 ASTIGMATISM OF BOTH EYES, UNSPECIFIED TYPE: ICD-10-CM

## 2025-07-23 DIAGNOSIS — H26.492 PCO (POSTERIOR CAPSULAR OPACIFICATION), LEFT: ICD-10-CM

## 2025-07-23 DIAGNOSIS — H52.4 PRESBYOPIA: ICD-10-CM

## 2025-07-23 DIAGNOSIS — H02.831 DERMATOCHALASIS OF BOTH UPPER EYELIDS: ICD-10-CM

## 2025-07-23 PROCEDURE — 92004 COMPRE OPH EXAM NEW PT 1/>: CPT | Performed by: OPHTHALMOLOGY

## 2025-07-23 PROCEDURE — 92015 DETERMINE REFRACTIVE STATE: CPT | Performed by: OPHTHALMOLOGY

## 2025-07-23 ASSESSMENT — REFRACTION_WEARINGRX
OS_SPHERE: +2.50
OD_AXIS: 120
OD_CYLINDER: -1.00
OS_AXIS: 080
OS_CYLINDER: -0.75
OD_SPHERE: +3.00

## 2025-07-23 ASSESSMENT — REFRACTION_MANIFEST
OD_SPHERE: +3.00
OD_AXIS: 096
METHOD_AUTOREFRACTION: 1
OS_AXIS: 065
OD_ADD: +2.50
OD_AXIS: 095
OS_SPHERE: -0.25
OS_CYLINDER: -1.25
OD_CYLINDER: -2.50
OS_SPHERE: +0.25
OS_AXIS: 063
OD_CYLINDER: -0.50
OD_SPHERE: +1.75
OS_ADD: +2.50
OS_CYLINDER: -1.00

## 2025-07-23 ASSESSMENT — TONOMETRY
IOP_METHOD: GOLDMANN APPLANATION
OS_IOP_MMHG: 14
OD_IOP_MMHG: 14

## 2025-07-23 ASSESSMENT — ENCOUNTER SYMPTOMS
HEMATOLOGIC/LYMPHATIC NEGATIVE: 0
CONSTITUTIONAL NEGATIVE: 0
CARDIOVASCULAR NEGATIVE: 0
MUSCULOSKELETAL NEGATIVE: 0
GASTROINTESTINAL NEGATIVE: 0
EYES NEGATIVE: 1
PSYCHIATRIC NEGATIVE: 0
RESPIRATORY NEGATIVE: 0
ENDOCRINE NEGATIVE: 0
NEUROLOGICAL NEGATIVE: 0
ALLERGIC/IMMUNOLOGIC NEGATIVE: 0

## 2025-07-23 ASSESSMENT — CONF VISUAL FIELD
OS_NORMAL: 1
OD_INFERIOR_TEMPORAL_RESTRICTION: 0
OS_SUPERIOR_NASAL_RESTRICTION: 0
OD_INFERIOR_NASAL_RESTRICTION: 0
OS_INFERIOR_NASAL_RESTRICTION: 0
OS_INFERIOR_TEMPORAL_RESTRICTION: 0
OD_NORMAL: 1
OS_SUPERIOR_TEMPORAL_RESTRICTION: 0
OD_SUPERIOR_NASAL_RESTRICTION: 0
OD_SUPERIOR_TEMPORAL_RESTRICTION: 0

## 2025-07-23 ASSESSMENT — VISUAL ACUITY
OD_BAT_MED: 20/25
METHOD: SNELLEN - LINEAR
OD_SC: 20/25
OD_SC+: -1
OS_SC: 20/25

## 2025-08-01 ENCOUNTER — HOSPITAL ENCOUNTER (OUTPATIENT)
Dept: RADIOLOGY | Facility: CLINIC | Age: 60
Discharge: HOME | End: 2025-08-01
Payer: COMMERCIAL

## 2025-08-01 ENCOUNTER — OFFICE VISIT (OUTPATIENT)
Dept: ORTHOPEDIC SURGERY | Facility: CLINIC | Age: 60
End: 2025-08-01
Payer: COMMERCIAL

## 2025-08-01 DIAGNOSIS — M19.072 OSTEOARTHRITIS OF LEFT MIDFOOT: Primary | ICD-10-CM

## 2025-08-01 DIAGNOSIS — M79.672 LEFT FOOT PAIN: ICD-10-CM

## 2025-08-01 PROCEDURE — 20600 DRAIN/INJ JOINT/BURSA W/O US: CPT | Mod: LT | Performed by: STUDENT IN AN ORGANIZED HEALTH CARE EDUCATION/TRAINING PROGRAM

## 2025-08-01 PROCEDURE — 2500000004 HC RX 250 GENERAL PHARMACY W/ HCPCS (ALT 636 FOR OP/ED): Performed by: STUDENT IN AN ORGANIZED HEALTH CARE EDUCATION/TRAINING PROGRAM

## 2025-08-01 PROCEDURE — 20600 DRAIN/INJ JOINT/BURSA W/O US: CPT | Performed by: STUDENT IN AN ORGANIZED HEALTH CARE EDUCATION/TRAINING PROGRAM

## 2025-08-01 PROCEDURE — 99214 OFFICE O/P EST MOD 30 MIN: CPT | Performed by: STUDENT IN AN ORGANIZED HEALTH CARE EDUCATION/TRAINING PROGRAM

## 2025-08-01 PROCEDURE — 99203 OFFICE O/P NEW LOW 30 MIN: CPT | Mod: 25 | Performed by: STUDENT IN AN ORGANIZED HEALTH CARE EDUCATION/TRAINING PROGRAM

## 2025-08-01 PROCEDURE — 73610 X-RAY EXAM OF ANKLE: CPT | Mod: LT

## 2025-08-01 PROCEDURE — 73630 X-RAY EXAM OF FOOT: CPT | Mod: LT

## 2025-08-01 RX ORDER — TRIAMCINOLONE ACETONIDE 40 MG/ML
40 INJECTION, SUSPENSION INTRA-ARTICULAR; INTRAMUSCULAR
Status: COMPLETED | OUTPATIENT
Start: 2025-08-01 | End: 2025-08-01

## 2025-08-01 RX ORDER — LIDOCAINE HYDROCHLORIDE 10 MG/ML
2 INJECTION, SOLUTION INFILTRATION; PERINEURAL
Status: COMPLETED | OUTPATIENT
Start: 2025-08-01 | End: 2025-08-01

## 2025-08-01 RX ADMIN — LIDOCAINE HYDROCHLORIDE 2 ML: 10 INJECTION, SOLUTION INFILTRATION; PERINEURAL at 22:27

## 2025-08-01 RX ADMIN — TRIAMCINOLONE ACETONIDE 40 MG: 200 INJECTION, SUSPENSION INTRA-ARTICULAR; INTRAMUSCULAR at 22:27

## 2025-08-01 ASSESSMENT — PAIN DESCRIPTION - DESCRIPTORS: DESCRIPTORS: ACHING;SORE

## 2025-08-01 ASSESSMENT — PAIN - FUNCTIONAL ASSESSMENT: PAIN_FUNCTIONAL_ASSESSMENT: 0-10

## 2025-08-01 ASSESSMENT — PAIN SCALES - GENERAL: PAINLEVEL_OUTOF10: 7

## 2025-08-02 NOTE — PROGRESS NOTES
ORTHOPAEDIC SURGERY OUTPATIENT PROGRESS NOTE    Chief Complaint:  Bilateral foot pain    History Of Present Illness  Christelle Wells is a 60 y.o. female who presents for evaluation of bilateral foot pain, left worse than right.  Patient has been experiencing pain in both of her feet, this has been ongoing.  Typical pain is reported as 7 out of 10.  She has not had any recent changes in activity or shoewear.  She works extensively on her feet throughout the day.  She has been following with Dr. Lugo regarding her knee.  She had previously discussed knee replacement with an [facility] provider.  Patient denies any specific trauma.  She has not been using any braces, orthotics or inserts in her shoes.  She has not had any formal HEP, PT or recent CSI.    Patient was previously evaluated by an [facility] podiatrist.  This was approximately 2 years ago, patient underwent CSI at that point with some relief.     Past Medical History  Medical History[1]    Surgical History  Recent Surgeries in Orthopaedic Surgery            No cases to display             Social History  Social History[2]    Family History  Family History[3]     Allergies  RX Allergies[4]    Review of Systems  REVIEW OF SYSTEMS  Constitutional: no unplanned weight loss  Psychiatric: no suicidal ideation  ENT: no vision changes, no sinus problems  Pulmonary: no shortness of breath  Lymphatic: no enlarged lymph nodes  Cardiovascular: no chest pain or shortness of breath  Gastrointestinal: no stomach problems  Genitourinary: no dysuria   Skin: no rashes  Endocrine: no thyroid problems  Neurological: no headache, no numbness  Hematological: no easy bruising  Musculoskeletal: Left foot pain     Physical Exam  PHYSICAL EXAMINATION  Constitutional Exam: well developed and well nourished  Psychiatric Exam: alert and oriented, appropriate mood and behavior  Eye Exam: EOMI  Pulmonary Exam: breathing non-labored, no apparent distress  Lymphatic exam: no appreciable lymphadenopathy  in the lower extremities  Cardiovascular exam: RRR to peripheral palpation, DP pulses 2+, PT 2+, toes are pink with good capillary refill, no pitting edema  Skin exam: no open lesions, rashes, abrasions or ulcerations  Neurological exam: sensation to light touch intact in both lower extremities in peripheral and dermatomal distributions (except for any abnormalities noted in musculoskeletal exam)    Musculoskeletal exam: Left lower extremity examination.  Patient pain localized to the dorsum of the midfoot.  She is tender to palpation there on examination today with pain with attempted midfoot stress as well as pain or key testing.  Patient has sensation intact to light touch grossly in a saphenous, sural, superficial peroneal, deep peroneal and tibial nerve distribution.  Patient has 5 out of 5 strength in plantarflexion, dorsiflexion and EHL. Patient has 2+ DP/PT pulse palpated.     Last Recorded Vitals  There were no vitals taken for this visit.    Laboratory Results  No results found for this or any previous visit (from the past 24 hours).     Radiology Results  X-ray imaging 3 view weightbearing left foot and ankle reviewed and independently evaluated by me on 8/1/2025 demonstrates no acute fracture or dislocation.  Incidental note of corticated fragment at the expected site of the Achilles tendon insertion as well as proximally at the fifth metatarsal base.  There are significant midfoot osteophytes dorsally particular at the navicular cuneiform articulation.  There are at least mild degenerative changes of the tarsometatarsal joints.    Patient ID: Christelle Wells is a 60 y.o. female.    S Inj/Asp on 8/1/2025 10:27 PM  Medications: 40 mg triamcinolone acetonide 40 mg/mL; 2 mL lidocaine 10 mg/mL (1 %)    I reviewed the risks and benefits of left midfoot corticosteroid injection with the patient.  Risks including pain, bleeding, infection, hypopigmentation of the skin, loss of subcutaneous fat, metabolic  complications and possibility that further injections may not be effective.  The patient gave informed consent for the procedure.       A time-out was called and confirmed identifying the left midfoot as the site of injection.  Sterile skin preparation was made over the left midfoot well lateral to the palpable DP pulse and medial to the projected course of the SPN.  Then, using sterile technique, the left midfoot was injected with 2 cc of 1% lidocaine and 1cc of kenalog 40 mg with no complications.   The patient was given post-procedure instructions and precautions.  I personally performed the procedure.              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  I have reviewed the diagnosis and treatment options extensively with the patient.  I would recommend that she continue weightbearing to her tolerance in her left lower extremity.  I will provide her with information regarding a carbon fiber insert for offloading of her midfoot.  I discussed the role for diagnostic and therapeutic left midfoot corticosteroid injection, please see my separate procedure note.  Patient has limited, by comparison, symptoms on the contralateral side and certainly consideration could be given to right midfoot CSI in the coming weeks.  The patient will contact the office if she develops worsening pain on the right side, otherwise we will plan to see the patient back in 3 months for consideration of repeat injection.    Frank Davis MD, SHARAD  Department of Orthopaedic Surgery  [facility]       [1]   Past Medical History:  Diagnosis Date    Anemia     MADINA (generalized anxiety disorder)     Hodgkin lymphoma, unspecified, spleen (Multi)     Hodgkin lymphoma, unspecified, spleen    Hyperlipidemia     Hypothyroidism     Major depressive disorder, recurrent, moderate     Unspecified cataract     Cataracts, both eyes   [2]    Social History  Socioeconomic History    Marital status: Single   Tobacco Use    Smoking status: Never    Smokeless tobacco: Never   Vaping Use    Vaping status: Never Used   Substance and Sexual Activity    Alcohol use: Not Currently    Drug use: Never    Sexual activity: Not Currently   [3] No family history on file.  [4] No Known Allergies

## 2025-08-15 ENCOUNTER — APPOINTMENT (OUTPATIENT)
Dept: GASTROENTEROLOGY | Facility: HOSPITAL | Age: 60
End: 2025-08-15
Payer: COMMERCIAL

## 2025-08-27 ENCOUNTER — APPOINTMENT (OUTPATIENT)
Dept: GASTROENTEROLOGY | Facility: HOSPITAL | Age: 60
End: 2025-08-27
Payer: COMMERCIAL

## 2025-08-30 ASSESSMENT — EXTERNAL EXAM - RIGHT EYE: OD_EXAM: NORMAL

## 2025-08-30 ASSESSMENT — EXTERNAL EXAM - LEFT EYE: OS_EXAM: NORMAL

## 2025-09-02 ENCOUNTER — APPOINTMENT (OUTPATIENT)
Dept: OPHTHALMOLOGY | Facility: CLINIC | Age: 60
End: 2025-09-02
Payer: COMMERCIAL

## 2025-09-02 PROBLEM — H00.14 CHALAZION OF LEFT UPPER EYELID: Status: ACTIVE | Noted: 2025-09-02

## 2025-09-02 ASSESSMENT — VISUAL ACUITY
METHOD: SNELLEN - LINEAR
METHOD: SNELLEN - LINEAR
OS_CC+: -1
OS_CC: J1
METHOD: SNELLEN - LINEAR
OS_CC: J2
OS_CC: 20/25
CORRECTION_TYPE: GLASSES
OD_CC: 20/25
OS_CC: J2
OD_SC: OLD GLASSES
OD_CC+: -2

## 2025-09-02 ASSESSMENT — REFRACTION_MANIFEST
OS_AXIS: 065
OD_CYLINDER: -2.50
OS_SPHERE: PLANO
OD_SPHERE: +3.00
OS_SPHERE: +0.00
OD_AXIS: 100
OD_AXIS: 100
OD_CYLINDER: -2.50
OS_ADD: +2.25
METHOD_AUTOREFRACTION: 1
OD_ADD: +2.25
OD_SPHERE: +3.00
OS_CYLINDER: -1.00
OS_AXIS: 080
OS_CYLINDER: -1.00

## 2025-09-02 ASSESSMENT — REFRACTION_WEARINGRX
OS_AXIS: 080
OD_ADD: +2.25
OD_SPHERE: +4.00
OS_AXIS: 080
OD_CYLINDER: -0.75
SPECS_TYPE: NVO
OD_SPHERE: +1.00
OS_CYLINDER: -1.00
OS_SPHERE: +2.50
OS_CYLINDER: -1.00
SPECS_TYPE: NVO
OD_SPHERE: +4.00
OD_SPHERE: +2.50
OS_ADD: +2.25
OS_SPHERE: +0.25
OS_AXIS: 060
OS_SPHERE: +2.25
SPECS_TYPE: PROG
OD_AXIS: 095
OD_AXIS: 115
OD_CYLINDER: -0.25
OD_CYLINDER: -1.25
OD_AXIS: 105
OS_CYLINDER: -0.75
OS_SPHERE: +2.00
OD_CYLINDER: -0.25
OS_CYLINDER: -0.75
SPECS_TYPE: NVO
OD_AXIS: 095
OS_AXIS: 060

## 2025-09-02 ASSESSMENT — REFRACTION
OD_CYLINDER: -1.75
OD_AXIS: 100
OS_ADD: +2.25
OD_ADD: +2.25
OS_CYLINDER: -1.00
OS_SPHERE: PLANO
OD_SPHERE: +2.00
OS_AXIS: 080

## 2025-09-02 ASSESSMENT — TONOMETRY
IOP_METHOD: GOLDMANN APPLANATION
OD_IOP_MMHG: 17
OS_IOP_MMHG: 17

## 2025-10-13 ENCOUNTER — APPOINTMENT (OUTPATIENT)
Dept: OPHTHALMOLOGY | Facility: CLINIC | Age: 60
End: 2025-10-13
Payer: COMMERCIAL

## 2026-07-28 ENCOUNTER — APPOINTMENT (OUTPATIENT)
Dept: OPHTHALMOLOGY | Facility: CLINIC | Age: 61
End: 2026-07-28
Payer: COMMERCIAL